# Patient Record
Sex: MALE | Race: BLACK OR AFRICAN AMERICAN
[De-identification: names, ages, dates, MRNs, and addresses within clinical notes are randomized per-mention and may not be internally consistent; named-entity substitution may affect disease eponyms.]

---

## 2024-05-31 ENCOUNTER — NON-APPOINTMENT (OUTPATIENT)
Age: 49
End: 2024-05-31

## 2024-05-31 PROBLEM — Z00.00 ENCOUNTER FOR PREVENTIVE HEALTH EXAMINATION: Status: ACTIVE | Noted: 2024-05-31

## 2024-06-07 ENCOUNTER — APPOINTMENT (OUTPATIENT)
Dept: COLORECTAL SURGERY | Facility: CLINIC | Age: 49
End: 2024-06-07

## 2024-06-11 ENCOUNTER — APPOINTMENT (OUTPATIENT)
Dept: COLORECTAL SURGERY | Facility: CLINIC | Age: 49
End: 2024-06-11

## 2024-07-29 ENCOUNTER — EMERGENCY (EMERGENCY)
Facility: HOSPITAL | Age: 49
LOS: 1 days | Discharge: HOME | End: 2024-07-29
Attending: EMERGENCY MEDICINE | Admitting: EMERGENCY MEDICINE
Payer: MEDICARE

## 2024-07-29 VITALS
DIASTOLIC BLOOD PRESSURE: 110 MMHG | OXYGEN SATURATION: 99 % | TEMPERATURE: 98 F | WEIGHT: 225.09 LBS | HEIGHT: 76 IN | HEART RATE: 103 BPM | SYSTOLIC BLOOD PRESSURE: 163 MMHG | RESPIRATION RATE: 19 BRPM

## 2024-07-29 VITALS
RESPIRATION RATE: 18 BRPM | SYSTOLIC BLOOD PRESSURE: 143 MMHG | OXYGEN SATURATION: 95 % | DIASTOLIC BLOOD PRESSURE: 76 MMHG | HEART RATE: 71 BPM

## 2024-07-29 DIAGNOSIS — Z21 ASYMPTOMATIC HUMAN IMMUNODEFICIENCY VIRUS [HIV] INFECTION STATUS: ICD-10-CM

## 2024-07-29 DIAGNOSIS — Z87.442 PERSONAL HISTORY OF URINARY CALCULI: ICD-10-CM

## 2024-07-29 DIAGNOSIS — N48.30 PRIAPISM, UNSPECIFIED: ICD-10-CM

## 2024-07-29 DIAGNOSIS — R06.02 SHORTNESS OF BREATH: ICD-10-CM

## 2024-07-29 DIAGNOSIS — I10 ESSENTIAL (PRIMARY) HYPERTENSION: ICD-10-CM

## 2024-07-29 LAB
ANION GAP SERPL CALC-SCNC: 11 MMOL/L — SIGNIFICANT CHANGE UP (ref 5–17)
APTT BLD: 25.1 SEC — SIGNIFICANT CHANGE UP (ref 24.5–35.6)
BASE EXCESS BLDV CALC-SCNC: -4.4 MMOL/L — LOW (ref -2–3)
BUN SERPL-MCNC: 12 MG/DL — SIGNIFICANT CHANGE UP (ref 7–23)
CA-I SERPL-SCNC: 1.31 MMOL/L — SIGNIFICANT CHANGE UP (ref 1.15–1.33)
CALCIUM SERPL-MCNC: 10.6 MG/DL — HIGH (ref 8.4–10.5)
CHLORIDE SERPL-SCNC: 101 MMOL/L — SIGNIFICANT CHANGE UP (ref 96–108)
CO2 BLDV-SCNC: 28.4 MMOL/L — HIGH (ref 22–26)
CO2 SERPL-SCNC: 29 MMOL/L — SIGNIFICANT CHANGE UP (ref 22–31)
CREAT SERPL-MCNC: 1.23 MG/DL — SIGNIFICANT CHANGE UP (ref 0.5–1.3)
EGFR: 72 ML/MIN/1.73M2 — SIGNIFICANT CHANGE UP
GAS PNL BLDV: 138 MMOL/L — SIGNIFICANT CHANGE UP (ref 136–145)
GAS PNL BLDV: SIGNIFICANT CHANGE UP
GLUCOSE SERPL-MCNC: 128 MG/DL — HIGH (ref 70–99)
HCO3 BLDV-SCNC: 26 MMOL/L — SIGNIFICANT CHANGE UP (ref 22–29)
HCT VFR BLD CALC: 39.6 % — SIGNIFICANT CHANGE UP (ref 39–50)
HCT VFR BLD CALC: 42.3 % — SIGNIFICANT CHANGE UP (ref 39–50)
HGB BLD-MCNC: 13.1 G/DL — SIGNIFICANT CHANGE UP (ref 13–17)
HGB BLD-MCNC: 13.6 G/DL — SIGNIFICANT CHANGE UP (ref 13–17)
INR BLD: 1.09 — SIGNIFICANT CHANGE UP (ref 0.85–1.18)
MCHC RBC-ENTMCNC: 28.9 PG — SIGNIFICANT CHANGE UP (ref 27–34)
MCHC RBC-ENTMCNC: 29 PG — SIGNIFICANT CHANGE UP (ref 27–34)
MCHC RBC-ENTMCNC: 32.2 GM/DL — SIGNIFICANT CHANGE UP (ref 32–36)
MCHC RBC-ENTMCNC: 33.1 GM/DL — SIGNIFICANT CHANGE UP (ref 32–36)
MCV RBC AUTO: 87.8 FL — SIGNIFICANT CHANGE UP (ref 80–100)
MCV RBC AUTO: 89.8 FL — SIGNIFICANT CHANGE UP (ref 80–100)
NRBC # BLD: 0 /100 WBCS — SIGNIFICANT CHANGE UP (ref 0–0)
NRBC # BLD: 0 /100 WBCS — SIGNIFICANT CHANGE UP (ref 0–0)
PCO2 BLDV: 75 MMHG — CRITICAL HIGH (ref 42–55)
PH BLDV: 7.15 — CRITICAL LOW (ref 7.32–7.43)
PLATELET # BLD AUTO: 162 K/UL — SIGNIFICANT CHANGE UP (ref 150–400)
PLATELET # BLD AUTO: 173 K/UL — SIGNIFICANT CHANGE UP (ref 150–400)
PO2 BLDV: <33 MMHG — SIGNIFICANT CHANGE UP (ref 25–45)
POTASSIUM BLDV-SCNC: 2.9 MMOL/L — CRITICAL LOW (ref 3.5–5.1)
POTASSIUM SERPL-MCNC: 3.4 MMOL/L — LOW (ref 3.5–5.3)
POTASSIUM SERPL-SCNC: 3.4 MMOL/L — LOW (ref 3.5–5.3)
PROTHROM AB SERPL-ACNC: 12.4 SEC — SIGNIFICANT CHANGE UP (ref 9.5–13)
RBC # BLD: 4.51 M/UL — SIGNIFICANT CHANGE UP (ref 4.2–5.8)
RBC # BLD: 4.71 M/UL — SIGNIFICANT CHANGE UP (ref 4.2–5.8)
RBC # FLD: 14.5 % — SIGNIFICANT CHANGE UP (ref 10.3–14.5)
RBC # FLD: 14.6 % — HIGH (ref 10.3–14.5)
SAO2 % BLDV: 34.4 % — LOW (ref 67–88)
SODIUM SERPL-SCNC: 141 MMOL/L — SIGNIFICANT CHANGE UP (ref 135–145)
WBC # BLD: 6.15 K/UL — SIGNIFICANT CHANGE UP (ref 3.8–10.5)
WBC # BLD: 6.49 K/UL — SIGNIFICANT CHANGE UP (ref 3.8–10.5)
WBC # FLD AUTO: 6.15 K/UL — SIGNIFICANT CHANGE UP (ref 3.8–10.5)
WBC # FLD AUTO: 6.49 K/UL — SIGNIFICANT CHANGE UP (ref 3.8–10.5)

## 2024-07-29 PROCEDURE — 93010 ELECTROCARDIOGRAM REPORT: CPT

## 2024-07-29 PROCEDURE — 84132 ASSAY OF SERUM POTASSIUM: CPT

## 2024-07-29 PROCEDURE — 84295 ASSAY OF SERUM SODIUM: CPT

## 2024-07-29 PROCEDURE — 99284 EMERGENCY DEPT VISIT MOD MDM: CPT | Mod: 25

## 2024-07-29 PROCEDURE — 85027 COMPLETE CBC AUTOMATED: CPT

## 2024-07-29 PROCEDURE — 99291 CRITICAL CARE FIRST HOUR: CPT

## 2024-07-29 PROCEDURE — 96374 THER/PROPH/DIAG INJ IV PUSH: CPT | Mod: XU

## 2024-07-29 PROCEDURE — 85730 THROMBOPLASTIN TIME PARTIAL: CPT

## 2024-07-29 PROCEDURE — 93005 ELECTROCARDIOGRAM TRACING: CPT

## 2024-07-29 PROCEDURE — 36415 COLL VENOUS BLD VENIPUNCTURE: CPT

## 2024-07-29 PROCEDURE — 96376 TX/PRO/DX INJ SAME DRUG ADON: CPT | Mod: XU

## 2024-07-29 PROCEDURE — 82330 ASSAY OF CALCIUM: CPT

## 2024-07-29 PROCEDURE — 80048 BASIC METABOLIC PNL TOTAL CA: CPT

## 2024-07-29 PROCEDURE — 85610 PROTHROMBIN TIME: CPT

## 2024-07-29 PROCEDURE — 54220 IRRG CRPRA CAVRNOSA PRIAPISM: CPT

## 2024-07-29 PROCEDURE — 82803 BLOOD GASES ANY COMBINATION: CPT

## 2024-07-29 RX ORDER — HYDROMORPHONE HCL 0.2 MG/ML
1 INJECTION, SOLUTION INTRAVENOUS ONCE
Refills: 0 | Status: COMPLETED | OUTPATIENT
Start: 2024-07-29 | End: 2024-07-29

## 2024-07-29 RX ORDER — PHENYLEPHRINE HYDROCHLORIDE 10 MG/ML
10 INJECTION INTRAVENOUS ONCE
Refills: 0 | Status: COMPLETED | OUTPATIENT
Start: 2024-07-29 | End: 2024-07-29

## 2024-07-29 RX ORDER — CABOTEGRAVIR AND RILPIVIRINE 600-900/3
0 KIT INTRAMUSCULAR
Refills: 0 | DISCHARGE

## 2024-07-29 RX ORDER — HYDROMORPHONE HCL 0.2 MG/ML
1 INJECTION, SOLUTION INTRAVENOUS ONCE
Refills: 0 | Status: DISCONTINUED | OUTPATIENT
Start: 2024-07-29 | End: 2024-07-29

## 2024-07-29 RX ORDER — ONDANSETRON HYDROCHLORIDE 2 MG/ML
4 INJECTION INTRAMUSCULAR; INTRAVENOUS ONCE
Refills: 0 | Status: DISCONTINUED | OUTPATIENT
Start: 2024-07-29 | End: 2024-07-29

## 2024-07-29 RX ORDER — SODIUM CHLORIDE 0.9 % (FLUSH) 0.9 %
1000 SYRINGE (ML) INJECTION ONCE
Refills: 0 | Status: COMPLETED | OUTPATIENT
Start: 2024-07-29 | End: 2024-07-29

## 2024-07-29 RX ORDER — AMLODIPINE AND VALSARTAN 5; 160 MG/1; MG/1
0 TABLET, FILM COATED ORAL
Refills: 0 | DISCHARGE

## 2024-07-29 RX ORDER — LIDOCAINE HYDROCHLORIDE 20 MG/ML
20 INJECTION, SOLUTION EPIDURAL; INFILTRATION; INTRACAUDAL; PERINEURAL ONCE
Refills: 0 | Status: COMPLETED | OUTPATIENT
Start: 2024-07-29 | End: 2024-07-29

## 2024-07-29 RX ADMIN — LIDOCAINE HYDROCHLORIDE 20 MILLILITER(S): 20 INJECTION, SOLUTION EPIDURAL; INFILTRATION; INTRACAUDAL; PERINEURAL at 14:00

## 2024-07-29 RX ADMIN — PHENYLEPHRINE HYDROCHLORIDE 3 MILLIGRAM(S): 10 INJECTION INTRAVENOUS at 14:00

## 2024-07-29 RX ADMIN — HYDROMORPHONE HCL 1 MILLIGRAM(S): 0.2 INJECTION, SOLUTION INTRAVENOUS at 13:39

## 2024-07-29 RX ADMIN — Medication 1000 MILLILITER(S): at 16:37

## 2024-07-29 RX ADMIN — HYDROMORPHONE HCL 1 MILLIGRAM(S): 0.2 INJECTION, SOLUTION INTRAVENOUS at 13:15

## 2024-07-29 RX ADMIN — HYDROMORPHONE HCL 1 MILLIGRAM(S): 0.2 INJECTION, SOLUTION INTRAVENOUS at 13:00

## 2024-07-29 NOTE — PROCEDURE NOTE - GENERAL PROCEDURE DETAILS
Aspiration of ~350cc of dark, coffee colored blood from bilateral corpora. Irrigation with 10cc normal saline. Injection of 1000mcg phenylephrine total

## 2024-07-29 NOTE — ED ADULT NURSE NOTE - OBJECTIVE STATEMENT
Pt is a 48y/o M presenting to the ED w/ c/o of priapism since 3am this morning following taking new ED med, pain started several hours ago, worsening. Pt UPGRADED to  Director,  Director @ bedside. Pt w/ PMHx HTN (did NOT take meds today), HIV+ (compliant w/ med injections). Pt endorses pain to genitalia, SOB/nausea/dizziness/lightheadedness r/t pain. Pt currently denies fever/chills, abd pain, V/D, HA, blurry vision, numbness/tingling, nasal congestion, cough, sore throat, urinary symptoms, recent falls @ home. Pt A/Ox3, speaking in clear/complete sentences. Pt anxious upon assessment d/t pain. Respirations easy/even and unlabored on RA. Pt ambulates independently w/ steady gait. Pt placed in gown, on continuous cardiac monitor and pulse ox. IV placed, labs drawn. EKG to be completed.

## 2024-07-29 NOTE — CONSULT NOTE ADULT - SUBJECTIVE AND OBJECTIVE BOX
HPI: 48 yo male with h/o HIV, ED, HTN, and kidney stones, presented to ED this am c/o painful erect penis since 3am. Patient injected Bimix around 3am (his second time after a test dose few weeks back). +difficulty voiding secondary to the pain. No fever/chills. No penile d/c.       PAST MEDICAL & SURGICAL HISTORY:  Calculus of kidney  Nephrolithiasis      HIV disease      H/O erectile dysfunction      HTN (hypertension)          MEDICATIONS  (STANDING):  HYDROmorphone  Injectable 1 milliGRAM(s) IV Push Once  lidocaine 1% Injectable 20 milliLiter(s) Local Injection Once  lidocaine 1% Injectable 20 milliLiter(s) Local Injection Once  phenylephrine  1 mG/mL Injectable 10 milliGRAM(s) IntraCavernosal once    MEDICATIONS  (PRN):      Allergies    No Known Allergies    Intolerances        SOCIAL HISTORY:    FAMILY HISTORY:      Vital Signs Last 24 Hrs  T(C): 36.5 (29 Jul 2024 12:36), Max: 36.5 (29 Jul 2024 12:36)  T(F): 97.7 (29 Jul 2024 12:36), Max: 97.7 (29 Jul 2024 12:36)  HR: 93 (29 Jul 2024 13:37) (86 - 103)  BP: 154/80 (29 Jul 2024 13:37) (154/80 - 171/97)  BP(mean): --  RR: 17 (29 Jul 2024 13:37) (17 - 19)  SpO2: 97% (29 Jul 2024 13:37) (97% - 99%)    Parameters below as of 29 Jul 2024 13:37  Patient On (Oxygen Delivery Method): room air        On PE:  General: alert and awake  Abdomen: soft, NT, ND  : +priapism  EXT: no c/c/e    LABS:                        13.1   6.15  )-----------( 173      ( 29 Jul 2024 14:31 )             39.6     07-29    141  |  101  |  12  ----------------------------<  128<H>  3.4<L>   |  29  |  1.23    Ca    10.6<H>      29 Jul 2024 13:04      PT/INR - ( 29 Jul 2024 13:04 )   PT: 12.4 sec;   INR: 1.09          PTT - ( 29 Jul 2024 13:04 )  PTT:25.1 sec  Urinalysis Basic - ( 29 Jul 2024 13:04 )    Color: x / Appearance: x / SG: x / pH: x  Gluc: 128 mg/dL / Ketone: x  / Bili: x / Urobili: x   Blood: x / Protein: x / Nitrite: x   Leuk Esterase: x / RBC: x / WBC x   Sq Epi: x / Non Sq Epi: x / Bacteria: x        RADIOLOGY & ADDITIONAL STUDIES:

## 2024-07-29 NOTE — ED ADULT NURSE NOTE - HIV CLINIC
PATIENT:JOSE ORTIZ                              MEDICAL RECORD: [id number]
                                                         LOCATION:[facility]
                                                         ADMISSION DATE: 08/14/19
 
PROGRESS NOTE
 
 
DATE OF SERVICE:  08/19/2019
 
SUBJECTIVE:  The patient's case was discussed with staff.  He has no new
complaint.
 
OBJECTIVE:  The patient denies he would seek to harm himself or others.  He is
tolerating his medicines well.
 
ASSESSMENT:  No change in diagnoses.
 
PLAN:  Brief supportive and educational interventions were made.  Long-term
prognosis is guarded.
 
TRANSINT:[id number] Voice Confirmation ID: [id number] DOCUMENT ID: [id number]
 
 
 
 
                                           
                                           CAMERON RAINEY MD             
 
 
 
Electronically Signed by CAMERON RAINEY on 08/20/19 at 1533
 
 
 
 
 
 
 
 
 
 
 
 
 
 
 
 
 
 
 
 
CC:                                                             [id number]
DICTATION DATE: 08/19/19 1516     :     08/19/19 1551      ADM IN  
                                                                              
[id number] [place] [address] Yes

## 2024-07-29 NOTE — ED PROVIDER NOTE - NSFOLLOWUPCLINICS_GEN_ALL_ED_FT
[facility]  Urology  [address], [address]  [place], [place] [address]  Phone: [phone]  Fax:

## 2024-07-29 NOTE — ED PROVIDER NOTE - CLINICAL SUMMARY MEDICAL DECISION MAKING FREE TEXT BOX
Pt c/o priapism since 3a s/p trimix injection.  Plan emergent gu eval and intervention, labs, pain meds; phenylephrine discussed w pharmacist to expedite order and arrival in ed.   Pt c/o sob - suspect 2/2 pain, lung cta, nl sat.  BP elevated on arrival -suspect 2/2 pain and no meds this am; no cp, ha.  Will monitor sx.  Dispo per gu; reassess. See progress notes for further mdm related documentation.

## 2024-07-29 NOTE — ED PROVIDER NOTE - NSICDXPASTMEDICALHX_GEN_ALL_CORE_FT
PAST MEDICAL HISTORY:  Calculus of kidney Nephrolithiasis    H/O erectile dysfunction     HIV disease     HTN (hypertension)

## 2024-07-29 NOTE — ED PROVIDER NOTE - OBJECTIVE STATEMENT
48 yo M h/o hiv (on meds, undetectable VL), htn (no meds today), ED c/o priapism since ~3a after injecting Trimix at ~ 230; pt thinks he may have accidentally injected more than the prior 18mg he'd used once before w/o adverse event.  Pt c/o painful erection, also feeling mildly sob and like he might pass out 2/2 pain.  Has not urinated since onset but denies preceding dysuria, abnl discharge.  No abd pain, n/v/d, fever, trauma.

## 2024-07-29 NOTE — CONSULT NOTE ADULT - PROBLEM SELECTOR RECOMMENDATION 9
-set up telemetry  -consent for corporal aspiration and irrigation obtained  -phenylephrine ordered  -continue present care

## 2024-07-29 NOTE — ED PROVIDER NOTE - NSFOLLOWUPINSTRUCTIONS_ED_ALL_ED_FT
Priapism    Please continue your normal medications but do not use Trimix.  Please follow up with urology clinic this Friday - call to make an appointment time; You may call our referrals coordinator at [phone] Monday to Friday 11am-7pm for assistance with making an appointment     Please call to make appointment in urology clinic: [phone]     Return for recurrent erection that does not resolve, pain, fever, inability to void, painful urination, bloody urine, any other concerns.    Priapism  Priapism is an unwanted erection of the penis that lasts longer than 4 hours even without sexual stimulation or desire. Priapism affects males of all ages. There are three types of priapism:  Acute ischemic priapism, also called low-flow priapism. This involves the failure of blood to flow out of the penis. With this type, erections are painful. It can lead to erectile dysfunction.  Non-ischemic priapism, also called high-flow priapism. This involves too much flow of blood into the penis. With this type, erections are usually painless. The penis gets erect but not rigid.  Stuttering (recurrent) priapism. This is a form of ischemic priapism in which erections may stop and start. With this type, erections usually occur during sleep and are painful. This type can lead to erectile dysfunction.  What are the causes?  This condition develops either when blood has difficulty leaving the penis (low-flow priapism) or when too much blood flows into the penis (high-flow priapism). Blood flow issues may be caused by:  Medicines, such as:  Erectile dysfunction medicine. This is the most common cause.  Medicine that is used to treat depression, anxiety, high blood pressure, or attention deficit hyperactivity disorder (ADHD).  Blood thinners.  Medical disorders, such as:  Blood problems that are common in people who have sickle cell disease or leukemia.  Diabetes.  Neurological problems, such as multiple sclerosis.  Tumors.  An infection.  An injury to the penis.  Other causes may include:  Excessive alcohol use.  Use of marijuana.  Use of illegal drugs, such as cocaine.  In some cases, the cause is not known.    What are the signs or symptoms?  Symptoms of this condition include:  A prolonged erection in the absence of stimulation.  A painful erection.  How is this diagnosed?  This condition is diagnosed with a physical exam. Blood tests and imaging tests such as an ultrasound or MRI may be done to help identify the cause of the condition.    How is this treated?  Treatment for this condition depends on the cause and the type of priapism. Acute ischemic priapism should be treated right away at a hospital, where treatment may involve:  Getting fluid and medicines for pain through an IV.  Having a procedure to drain blood from the penis.  Having surgery to make a passageway for blood to flow in the penis (surgical shunting).  Having a blood transfusion if you have sickle cell disease.  No standard treatment exists for stuttering priapism. If the erections last longer than 4 hours, the treatments will be the same as the treatments for acute ischemic priapism.    Non-ischemic priapism and stuttering priapism are often managed at home. Your health care provider may prescribe oral medicines that may help control or decrease the erections.    Follow these instructions at home:  Medicines    Take over-the-counter and prescription medicines only as told by your health care provider.  Do not take any medicines during an episode unless you get approval from your health care provider.  General instructions    A person writing in a notebook.  A bathtub filled with water.  Avoid drugs or alcohol if they caused the priapism. Avoiding them can help to prevent the condition from coming back.  Avoid sexual stimulation and intercourse until your health care provider says that they are okay for you.  Keep track of how long your erection lasts. If it does not go away in 4 hours, seek medical care.  Follow instructions from your health care provider. Your health care provider may tell you to:  Try exercising or taking a warm bath.  Put a cold pack and gentle pressure on your perineum, which is the area between the anus and the scrotum.  Take a cold shower.  Drink enough fluid to keep your urine pale yellow.  Empty your bladder as much as possible.  Keep all follow-up visits. This is important.  Contact a health care provider if:  Your pain does not improve with treatment.  You have a fever.  You have more pain, swelling, or redness in your genitals or your groin area.  Get help right away if:  You have acute ischemic priapism or stuttering priapism and your erection does not go away in 4 hours.  Summary  Priapism is an unwanted erection of the penis that usually develops without sexual stimulation or desire.  Treatment for this condition depends on the cause and the type of priapism. Non-ischemic priapism and stuttering priapism are often managed at home. Acute ischemic priapism is usually treated at a hospital.  Avoid drugs or alcohol if they caused the priapism. This can help to prevent the condition from coming back.  Take over-the-counter and prescription medicines only as told by your health care provider.  Keep track of how long your erection lasts. If it does not go away in 4 hours, seek medical care.

## 2024-07-29 NOTE — ED PROVIDER NOTE - PATIENT PORTAL LINK FT
You can access the FollowMyHealth Patient Portal offered by [facility] by registering at the following website: [email or web address]. By joining [employer]’s FollowMyHealth portal, you will also be able to view your health information using other applications (apps) compatible with our system.

## 2024-07-29 NOTE — ED PROVIDER NOTE - PROGRESS NOTE DETAILS
TAMERA consulted during my initial eval of pt and recontacted to let them know the phenylephrine was available to administer; report they are on their way.  Pt c/o cont pain w/o relief w pain meds.  BP improved slightly after pain meds; will cont to monitor. [facility] w pt - s/p phenylephrine and drainage, now w improvement.  [facility] wants to watch for ~ 1h and reassess; mehdi reports they removed ~ 80 cc so want a rpt cbc.  Pt feeling much better. [facility]I w pt - s/p phenylephrine and drainage, now w improvement.  [facility] wants to watch for ~ 1h and reassess; mehdi reports they removed ~ 360 cc so want a rpt cbc.  Pt feeling much better. Rpt cbc wnl.  Pt c/o n, lh sensation that both resolved after he vomited x 1; now feeling well and tolerating po's w/o sx.  Pt re-eval by gu; initially unable to void, then voided but w pvr.  Pt has since voided again w rpt pvr by ed rn 150.  Discussed w gu - cleared for dc if pvr < 250; to fu w gu clinic this week.  Will dc.

## 2024-07-29 NOTE — ED PROVIDER NOTE - PHYSICAL EXAMINATION
VITAL SIGNS: I have reviewed nursing notes and confirm.  CONSTITUTIONAL:  in severe painful distress.   SKIN:  warm and dry, no acute rash.   HEAD:  normocephalic, atraumatic.  EYES: PERRL, EOM intact; conjunctiva and sclera clear.  ENT: No nasal discharge; airway clear.   NECK: Supple; non tender.  CARD: S1, S2 normal; no murmurs, gallops, or rubs. Regular rate and rhythm.   RESP:  Clear to auscultation b/l, no wheezes, rales or rhonchi.  ABD: Normal bowel sounds; soft; non-distended; non-tender; no guarding/ rebound.  : erect penis, no discharge or blood, testicles normal, + ttp penis,  no erythema, swelling, mass   MSK: Normal ROM. No clubbing, cyanosis or edema. no ttp bilat le  NEURO: Alert, oriented, grossly unremarkable  PSYCH: Cooperative, mood and affect appropriate.

## 2024-07-30 PROBLEM — I10 ESSENTIAL (PRIMARY) HYPERTENSION: Chronic | Status: ACTIVE | Noted: 2024-07-29

## 2024-07-30 PROBLEM — B20 HUMAN IMMUNODEFICIENCY VIRUS [HIV] DISEASE: Chronic | Status: ACTIVE | Noted: 2024-07-29

## 2024-07-30 PROBLEM — Z87.438 PERSONAL HISTORY OF OTHER DISEASES OF MALE GENITAL ORGANS: Chronic | Status: ACTIVE | Noted: 2024-07-29

## 2024-08-02 ENCOUNTER — APPOINTMENT (OUTPATIENT)
Dept: UROLOGY | Facility: CLINIC | Age: 49
End: 2024-08-02